# Patient Record
Sex: FEMALE | Race: WHITE
[De-identification: names, ages, dates, MRNs, and addresses within clinical notes are randomized per-mention and may not be internally consistent; named-entity substitution may affect disease eponyms.]

---

## 2018-06-01 ENCOUNTER — APPOINTMENT (OUTPATIENT)
Dept: ORTHOPEDIC SURGERY | Facility: CLINIC | Age: 43
End: 2018-06-01
Payer: COMMERCIAL

## 2018-06-01 VITALS — BODY MASS INDEX: 22.86 KG/M2 | HEIGHT: 63 IN | RESPIRATION RATE: 16 BRPM | WEIGHT: 129 LBS

## 2018-06-01 DIAGNOSIS — Z78.9 OTHER SPECIFIED HEALTH STATUS: ICD-10-CM

## 2018-06-01 DIAGNOSIS — M25.521 PAIN IN RIGHT ELBOW: ICD-10-CM

## 2018-06-01 PROBLEM — Z00.00 ENCOUNTER FOR PREVENTIVE HEALTH EXAMINATION: Status: ACTIVE | Noted: 2018-06-01

## 2018-06-01 PROCEDURE — 73070 X-RAY EXAM OF ELBOW: CPT | Mod: RT

## 2018-06-01 PROCEDURE — 99203 OFFICE O/P NEW LOW 30 MIN: CPT

## 2020-09-24 ENCOUNTER — RESULT REVIEW (OUTPATIENT)
Age: 45
End: 2020-09-24

## 2021-07-09 ENCOUNTER — TRANSCRIPTION ENCOUNTER (OUTPATIENT)
Age: 46
End: 2021-07-09

## 2022-04-08 ENCOUNTER — APPOINTMENT (RX ONLY)
Dept: URBAN - METROPOLITAN AREA CLINIC 23 | Facility: CLINIC | Age: 47
Setting detail: DERMATOLOGY
End: 2022-04-08

## 2022-04-08 DIAGNOSIS — Z41.9 ENCOUNTER FOR PROCEDURE FOR PURPOSES OTHER THAN REMEDYING HEALTH STATE, UNSPECIFIED: ICD-10-CM

## 2022-04-08 PROCEDURE — ? DYSPORT

## 2022-04-08 NOTE — PROCEDURE: DYSPORT
Lcl Root Units: 0
Expiration Date (Month Year): 12/31/2022
Glabellar Complex Units: [address] [facility]
Lot #: [id number]
Show Mentalis Units: No
Show Inventory Tab: Hide
Show Orbicularis Oculi Units: Yes
Additional Area 4 Location: lateral eyes
Additional Area 6 Location: [place] [place]
Periorbital Skin Units: 60
Detail Level: Zone
Additional Area 3 Location: lateral brows
Consent: Written consent obtained. Risks include but not limited to lid/brow ptosis, bruising, swelling, diplopia, temporary effect, incomplete chemical denervation.
Additional Area 2 Location: bunny line
Post-Care Instructions: Patient instructed to not lie down for 4 hours, limit physical activity for 24 hours and avoid manipulation of the treated areas.
Additional Area 5 Location: chin
Dilution (U/ 0.1cc): 1.5

## 2022-05-17 ENCOUNTER — APPOINTMENT (RX ONLY)
Dept: URBAN - METROPOLITAN AREA CLINIC 23 | Facility: CLINIC | Age: 47
Setting detail: DERMATOLOGY
End: 2022-05-17

## 2022-05-17 DIAGNOSIS — Z41.9 ENCOUNTER FOR PROCEDURE FOR PURPOSES OTHER THAN REMEDYING HEALTH STATE, UNSPECIFIED: ICD-10-CM

## 2022-05-17 DIAGNOSIS — L81.4 OTHER MELANIN HYPERPIGMENTATION: ICD-10-CM

## 2022-05-17 DIAGNOSIS — D18.0 HEMANGIOMA: ICD-10-CM

## 2022-05-17 DIAGNOSIS — B07.8 OTHER VIRAL WARTS: ICD-10-CM

## 2022-05-17 DIAGNOSIS — L85.3 XEROSIS CUTIS: ICD-10-CM

## 2022-05-17 PROBLEM — D18.01 HEMANGIOMA OF SKIN AND SUBCUTANEOUS TISSUE: Status: ACTIVE | Noted: 2022-05-17

## 2022-05-17 PROCEDURE — ? LIQUID NITROGEN

## 2022-05-17 PROCEDURE — ? COUNSELING

## 2022-05-17 PROCEDURE — ? DYSPORT

## 2022-05-17 PROCEDURE — 99213 OFFICE O/P EST LOW 20 MIN: CPT | Mod: 25

## 2022-05-17 PROCEDURE — ? IN-HOUSE DISPENSING PHARMACY

## 2022-05-17 PROCEDURE — 17110 DESTRUCTION B9 LES UP TO 14: CPT

## 2022-05-17 ASSESSMENT — LOCATION DETAILED DESCRIPTION DERM
LOCATION DETAILED: LEFT ANTERIOR DISTAL THIGH
LOCATION DETAILED: RIGHT DISTAL RADIAL THUMB

## 2022-05-17 ASSESSMENT — LOCATION SIMPLE DESCRIPTION DERM
LOCATION SIMPLE: RIGHT THUMB
LOCATION SIMPLE: LEFT THIGH

## 2022-05-17 ASSESSMENT — LOCATION ZONE DERM
LOCATION ZONE: LEG
LOCATION ZONE: FINGER

## 2022-05-17 NOTE — PROCEDURE: DYSPORT
Post-Care Instructions: Patient instructed to not lie down for 4 hours, limit physical activity for 24 hours and avoid manipulation of the treated areas.
Show Orbicularis Oculi Units: Yes
Show Inventory Tab: Show
[facility] Units: 0
Dilution (U/ 0.1cc): 1.5
Additional Area 6 Location: william [place]inche[place]
Show Right And Left Pupillary Line Units: No
Additional Area 1 Location: lateral eyes
Additional Area 3 Location: upper lip cutaneous
Detail Level: Zone
Additional Area 5 Location: glabella
Additional Area 2 Location: lateral brows
Consent: Written consent obtained. Risks include but not limited to lid/brow ptosis, bruising, swelling, diplopia, temporary effect, incomplete chemical denervation.
Expiration Date (Month Year): 2022-10
Forehead Units: 10
Lot #: [id number]
Additional Area 4 Location: left eyebrow

## 2022-05-17 NOTE — PROCEDURE: LIQUID NITROGEN
Medical Necessity Information: It is in your best interest to select a reason for this procedure from the list below. All of these items fulfill various CMS LCD requirements except the new and changing color options.
Spray Paint Technique: No
Show Aperture Variable?: Yes
Number Of Freeze-Thaw Cycles: 3 freeze-thaw cycles
Medical Necessity Clause: This procedure was medically necessary because the lesions that were treated were:
Post-Care Instructions: I reviewed with the patient in detail post-care instructions. Patient is to wear sunprotection, and avoid picking at any of the treated lesions. Pt may apply Vaseline to crusted or scabbing areas.
Spray Paint Text: The liquid nitrogen was applied to the skin utilizing a spray paint frosting technique.
Duration Of Freeze Thaw-Cycle (Seconds): 10
Consent: The patient's consent was obtained including but not limited to risks of crusting, scabbing, blistering, scarring, darker or lighter pigmentary change, recurrence, incomplete removal and infection.
Detail Level: Detailed

## 2022-05-17 NOTE — PROCEDURE: IN-HOUSE DISPENSING PHARMACY
Product 56 Units Dispensed: 0
Product 2 Price/Unit (In Dollars): 1
Product 25 Unit Type: mg
Product 13 Unit Type: unit(s)
Render Refills If Set To 0: No
Product 7 Application Directions: Apply one drop in each eye
Product 11 Unit Type: jar(s)
Product 10 Application Directions: Take two tablets x3 days
Name Of Product 13: Duoderm thin
Product 16 Amount/Unit (Numbers Only): 5
Name Of Product 14: Valtrex 500mg
Product 2 Refills: 3
Product 15 Application Directions: Take one tablet twice daily for 3-6 months
Name Of Product 12: Skin Better Even tone Serum
Name Of Product 31: Prednisone 20 mg
Product 7 Unit Type: kit(s)
Product 10 Unit Type: bottle(s)
Name Of Product 1: Tri-Katarina cream
Name Of Product 18: Bette Polanco
Name Of Product 21: Defenage skincare
Name Of Product 8: Prednisone 10mg
Product 6 Amount/Unit (Numbers Only): [address] [facility]
Product 15 Unit Type: tablets
Product 13 Price/Unit (In Dollars): $10.00
Product 12 Refills: 2
Name Of Product 11: Hydroquinone 8% pads
Product 6 Unit Type: grams
Product 5 Application Directions: Apply 1 drop to both Eyelashes at bedtime only
Product 12 Application Directions: Apply to the entire face in the Am and Pm
Product 14 Amount/Unit (Numbers Only): [address]
Detail Level: Detailed
Name Of Product 7: Verito Cueva
Product 13 Application Directions: Apply to wound site every 48 hours as indicated.
Name Of Product 10: Prednisone 20mg
Name Of Product 20: Apply pea size amount to entire face at bedtime only at bedtime only.
Product 4 Application Directions: Take 1 tablet today in office prior to procedure for anxiety.
Product 17 Application Directions: Take one tablet 20 mg today in office and 10 mg tomorrow if needed for swelling. Dispense in office.
Product 11 Application Directions: Apply to the face at bedtime
Product 16 Application Directions: Apply to lashes at bedtime only
Product 3 Application Directions: Apply to face  in the morning as indicated
Name Of Product 22: Calcipotriene/ 5 FLuoracil cream
Product 16 Unit Type: ml
Name Of Product 5: Latisse 3 ml
Product 19 Application Directions: Apply once daily to each eye.
Product 6 Application Directions: Apply pea size amount with cereve cream to chest at bedtime only.
Product 9 Application Directions: Apply to affected area on the face area am as indicated.
Product 2 Application Directions: Apply a pea size amount to Entire face at bedtime
Product 15 Amount/Unit (Numbers Only): [address] [facility]
Product 1 Price/Unit (In Dollars): 0.00
Product 22 Application Directions: Apply small amount to temple and forehead twice daily as indicated x 3 or 5 days
Name Of Product 4: Valium 5 mg
Product 14 Application Directions: Take one tablet prior to procedure. Continue twice daily x 3 days as indicated.
Product 2 Unit Type: tube(s)
Name Of Product 16: Latisse
Product 1 Application Directions: Apply to entire face at bedtime as indicated.
Name Of Product 3: Hydroquinone brightening pads 6%
Product 21 Application Directions: Use as directed
Product 8 Application Directions: Take 1 tablet
Name Of Product 15: Viviscal PRO
Name Of Product 6: Tretinoin 0.05% cream
Name Of Product 9: Brightening pads 6%
Name Of Product 19: Upneeq ophthalmic solution

## 2022-05-17 NOTE — PROCEDURE: MIPS QUALITY
Detail Level: Detailed
Additional Notes: Currently not taking any medications
Quality 130: Documentation Of Current Medications In The Medical Record: Documentation of a medical reason(s) for not documenting, updating, or reviewing the patientâs current medications list (e.g., patient is in an urgent or emergent medical situation)

## 2022-05-17 NOTE — PROCEDURE: COUNSELING
Detail Level: Zone
Moisturizer Recommendations: Ceramax cream
Detail Level: Generalized
Detail Level: Detailed

## 2022-08-02 ENCOUNTER — APPOINTMENT (RX ONLY)
Dept: URBAN - METROPOLITAN AREA CLINIC 23 | Facility: CLINIC | Age: 47
Setting detail: DERMATOLOGY
End: 2022-08-02

## 2022-08-02 DIAGNOSIS — Z41.9 ENCOUNTER FOR PROCEDURE FOR PURPOSES OTHER THAN REMEDYING HEALTH STATE, UNSPECIFIED: ICD-10-CM

## 2022-08-02 PROCEDURE — ? DYSPORT

## 2022-08-02 NOTE — PROCEDURE: DYSPORT
Show Additional Area 5: Yes
Additional Area 2 Location: high forehead
Post-Care Instructions: Patient instructed to not lie down for 4 hours, limit physical activity for 24 hours and avoid manipulation of the treated areas.
Show Right And Left Pupillary Line Units: No
Levator Labii Superioris Units: 0
Periorbital Skin Units: [address]
Detail Level: Zone
Lot #: [id number]
Additional Area 3 Location: lateral brow
Show Inventory Tab: Show
Additional Area 3 Units: 10
Forehead Units: 40
Dilution (U/ 0.1cc): 1.5
Additional Area 5 Location: under eyes
Additional Area 1 Location: Rt perioribital
Glabellar Complex Units: 27
Additional Area 4 Location: chin
Consent: Written consent obtained. Risks include but not limited to lid/brow ptosis, bruising, swelling, diplopia, temporary effect, incomplete chemical denervation.
Expiration Date (Month Year): 2022-10
Additional Area 6 Location: [place]

## 2022-10-18 ENCOUNTER — RX ONLY (OUTPATIENT)
Age: 47
Setting detail: RX ONLY
End: 2022-10-18

## 2022-10-18 RX ORDER — VALACYCLOVIR HYDROCHLORIDE 500 MG/1
TABLET, FILM COATED ORAL
Qty: 15 | Refills: 2 | Status: ERX | COMMUNITY
Start: 2022-10-18

## 2022-12-01 ENCOUNTER — APPOINTMENT (RX ONLY)
Dept: URBAN - METROPOLITAN AREA CLINIC 23 | Facility: CLINIC | Age: 47
Setting detail: DERMATOLOGY
End: 2022-12-01

## 2022-12-01 DIAGNOSIS — Z41.9 ENCOUNTER FOR PROCEDURE FOR PURPOSES OTHER THAN REMEDYING HEALTH STATE, UNSPECIFIED: ICD-10-CM

## 2022-12-01 PROCEDURE — ? RECOMMENDATIONS

## 2022-12-01 PROCEDURE — ? DYSPORT

## 2022-12-01 NOTE — PROCEDURE: DYSPORT
Glabellar Complex Units: [address]
Additional Area 1 Location: lateral brows
Show Periorbital Units: Yes
Show Inventory Tab: Show
R Brow Units: 0
Show Right And Left Pupillary Line Units: No
Additional Area 1 Units: 10
Dilution (U/ 0.1cc): 1.5
Additional Area 5 Location: lateral eyes
Additional Area 2 Location: high forehead
Consent: Written consent obtained. Risks include but not limited to lid/brow ptosis, bruising, swelling, diplopia, temporary effect, incomplete chemical denervation.
Periorbital Skin Units: [address] [facility]
Post-Care Instructions: Patient instructed to not lie down for 4 hours, limit physical activity for 24 hours and avoid manipulation of the treated areas.
Expiration Date (Month Year): 2022-10
Additional Area 6 Location: [place]iam [place]
Detail Level: Zone
Lot #: [id number]
Forehead Units: [address]
Additional Area 4 Location: chin

## 2022-12-01 NOTE — PROCEDURE: RECOMMENDATIONS
Detail Level: Zone
Recommendations (Free Text): Ematrix neck $500
Render Risk Assessment In Note?: no

## 2023-01-31 ENCOUNTER — RX ONLY (OUTPATIENT)
Age: 48
Setting detail: RX ONLY
End: 2023-01-31

## 2023-01-31 ENCOUNTER — APPOINTMENT (RX ONLY)
Dept: URBAN - METROPOLITAN AREA CLINIC 23 | Facility: CLINIC | Age: 48
Setting detail: DERMATOLOGY
End: 2023-01-31

## 2023-01-31 DIAGNOSIS — Z41.9 ENCOUNTER FOR PROCEDURE FOR PURPOSES OTHER THAN REMEDYING HEALTH STATE, UNSPECIFIED: ICD-10-CM

## 2023-01-31 PROCEDURE — ? EMATRIX

## 2023-01-31 PROCEDURE — ? BOTOX

## 2023-01-31 PROCEDURE — ? INVENTORY

## 2023-01-31 PROCEDURE — ? DYSPORT

## 2023-01-31 RX ORDER — LIDOCAINE 50 MG/G
OINTMENT TOPICAL
Qty: 30 | Refills: 0 | Status: ERX | COMMUNITY
Start: 2023-01-31

## 2023-01-31 ASSESSMENT — LOCATION SIMPLE DESCRIPTION DERM: LOCATION SIMPLE: LEFT ANTERIOR NECK

## 2023-01-31 ASSESSMENT — LOCATION DETAILED DESCRIPTION DERM: LOCATION DETAILED: LEFT SUPERIOR LATERAL NECK

## 2023-01-31 ASSESSMENT — LOCATION ZONE DERM: LOCATION ZONE: NECK

## 2023-01-31 NOTE — PROCEDURE: BOTOX
Nasal Root Units: 0
Additional Area 6 Location: Lip flip
Show Topical Anesthesia: Yes
Show Inventory Tab: Show
Show Right And Left Pupillary Line Units: No
Dilution (U/0.1 Cc): 2.5
Additional Area 1 Location: upper lip
Additional Area 5 Location: lateral brows
Additional Area 1 Units: 4
Detail Level: Detailed
Additional Area 3 Location: chin
Consent: Written consent obtained. Risks include but not limited to lid/brow ptosis, bruising, swelling, diplopia, temporary effect, incomplete chemical denervation.
Lot #: [id number]
Expiration Date (Month Year): 2024/05
Incrementing Botox Units: By 0.5 Units
Post-Care Instructions: Patient instructed to not lie down for 4 hours and limit physical activity for 24 hours. Patient instructed not to travel by airplane for 48 hours.
Additional Area 2 Location: lateral eyes

## 2023-01-31 NOTE — PROCEDURE: DYSPORT
Additional Area 2 Location: lateral brows NC
Show Additional Area 4: Yes
Left Pupillary Line Units: 0
Show Lcl Units: No
Additional Area 5 Location: lateral eyes
Dilution (U/ 0.1cc): 1.5
Detail Level: Zone
Consent: Written consent obtained. Risks include but not limited to lid/brow ptosis, bruising, swelling, diplopia, temporary effect, incomplete chemical denervation.
Expiration Date (Month Year): 2022-10
Additional Area 6 Location: [place]
Post-Care Instructions: Patient instructed to not lie down for 4 hours, limit physical activity for 24 hours and avoid manipulation of the treated areas.
Show Inventory Tab: Show
Forehead Units: [address]
Lot #: [id number]
Additional Area 1 Location: masseters ( TMJ)
Additional Area 4 Location: high forehead

## 2023-01-31 NOTE — PROCEDURE: EMATRIX
Detail Level: Zone
Consent: Written consent obtained, risks reviewed including but not limited to crusting, scabbing, blistering, scarring, darker or lighter pigmentary change, paradoxical hair regrowth, incomplete removal of hair and infection.
Program: GRACE
Endpoint: Immediate endpoint: perifollicular erythema and edema. Post care reviewed with patient.
Fluence: 53 J
Use Hsv Prophylaxis: No
Price (Use Numbers Only, No Special Characters Or $): 500.00
Post-Care Instructions: I reviewed with the patient in detail post-care instructions. Patient should avoid sun for a minimum of 4 weeks before and after treatment.

## 2023-03-29 ENCOUNTER — APPOINTMENT (RX ONLY)
Dept: URBAN - METROPOLITAN AREA CLINIC 23 | Facility: CLINIC | Age: 48
Setting detail: DERMATOLOGY
End: 2023-03-29

## 2023-03-29 DIAGNOSIS — Z41.9 ENCOUNTER FOR PROCEDURE FOR PURPOSES OTHER THAN REMEDYING HEALTH STATE, UNSPECIFIED: ICD-10-CM

## 2023-03-29 PROCEDURE — ? PICOWAY LASER

## 2023-03-29 PROCEDURE — ? EMATRIX

## 2023-03-29 PROCEDURE — ? ADDITIONAL NOTES

## 2023-03-29 ASSESSMENT — LOCATION ZONE DERM: LOCATION ZONE: FACE

## 2023-03-29 ASSESSMENT — LOCATION SIMPLE DESCRIPTION DERM: LOCATION SIMPLE: LEFT CHEEK

## 2023-03-29 ASSESSMENT — LOCATION DETAILED DESCRIPTION DERM: LOCATION DETAILED: LEFT SUPERIOR MEDIAL BUCCAL CHEEK

## 2023-03-29 NOTE — PROCEDURE: EMATRIX
Program: GRACE
Endpoint: Immediate endpoint: perifollicular erythema and edema. Post care reviewed with patient.
Fluence: 59 J
Consent: Written consent obtained, risks reviewed including but not limited to crusting, scabbing, blistering, scarring, darker or lighter pigmentary change, paradoxical hair regrowth, incomplete removal of hair and infection.
Post-Care Instructions: I reviewed with the patient in detail post-care instructions. Patient should avoid sun for a minimum of 4 weeks before and after treatment.
Price (Use Numbers Only, No Special Characters Or $): 500.00
Use Hsv Prophylaxis: No
Detail Level: Zone

## 2023-03-29 NOTE — PROCEDURE: ADDITIONAL NOTES
Render Risk Assessment In Note?: no
Additional Notes: Patient could not tolerate Ematrix today, converted treatment to [facility] treatment today.
Detail Level: Simple

## 2023-05-17 ENCOUNTER — APPOINTMENT (RX ONLY)
Dept: URBAN - METROPOLITAN AREA CLINIC 23 | Facility: CLINIC | Age: 48
Setting detail: DERMATOLOGY
End: 2023-05-17

## 2023-05-17 DIAGNOSIS — Z41.9 ENCOUNTER FOR PROCEDURE FOR PURPOSES OTHER THAN REMEDYING HEALTH STATE, UNSPECIFIED: ICD-10-CM

## 2023-05-17 PROCEDURE — ? DYSPORT

## 2023-05-17 PROCEDURE — ? MICRONEEDLING

## 2023-05-17 PROCEDURE — ? PICOWAY LASER

## 2023-05-17 PROCEDURE — ? INVENTORY

## 2023-05-17 NOTE — PROCEDURE: PICOWAY LASER
Spot Size: 6.0 mm x 6.0 mm
Treatment Number: 0
Detail Level: Zone
Treatment Number: 2
Hide Pulse Duration?: No
Frequency (Hz): 6 Hz
Fluence (J/Cm2): 0.7
Wavelength: 532 nm
Handpiece: Resolve
Handpiece: Zoom
Fluence (J/Cm2): 1.9
Frequency (Hz): 6Hz
Fluence (J/Cm2): 0.6
Wavelength: 1064 nm
Pre-Procedure: Prior to proceeding the treatment areas were cleaned and all present put on their eye protection.
Price (Use Numbers Only, No Special Characters Or $): Finn 354
Location Override: neck
Spot Size: 6.0 mm
Consent: Written consent obtained, risks reviewed including but not limited to crusting, scabbing, blistering, scarring, darker or lighter pigmentary change, paradoxical hair regrowth, incomplete removal of hair and infection.
Frequency (Hz): 6
Fluence (J/Cm2): 1.7
Post-Procedure Care: Immediate endpoint: perifollicular erythema and edema. Vaseline and ice applied. Post care reviewed with patient.
Post-Care Instructions: I reviewed with the patient in detail post-care instructions. Patient should avoid sun for a minimum of 4 weeks before and after treatment.

## 2023-05-17 NOTE — PROCEDURE: MICRONEEDLING
Infusions (Optional): hyaluronic acid
Depth In Mm: 2
Depth In Mm: 0.8
Detail Level: Zone
Depth In Mm: 0.1
Post-Care Instructions: After the procedure, take precautions agains sun exposure. Do not apply sunscreen for 12 hours after the procedure. Do not apply make-up for 12 hours after the procedure. Avoid alcohol based toners for 10-14 days. After 2-3 days patients can return to their regular skin regimen. \\nFace tip Lot # [id number]
Treatment Number (Optional): 1
Price (Use Numbers Only, No Special Characters Or $): Carlos Post
Consent: Written consent obtained, risks reviewed including but not limited to pain, scarring, infection and incomplete improvement. Patient understands the procedure is cosmetic in nature and will require out of pocket payment.

## 2023-05-17 NOTE — PROCEDURE: DYSPORT
Levator Labii Superioris Units: 0
Additional Area 5 Location: chin
Additional Area 6 Location: lip flip
Show Right And Left Brow Units: No
Additional Area 2 Location: High Forehead 2cm above brows
Post-Care Instructions: Patient instructed to not lie down for 4 hours, limit physical activity for 24 hours and avoid manipulation of the treated areas.
Expiration Date (Month Year): 2022-10
Periorbital Skin Units: 36
Show Additional Area 5: Yes
Detail Level: Zone
Additional Area 3 Location: lateral eyes
Lot #: [id number]
Glabellar Complex Units: [address]
Show Inventory Tab: Show
Forehead Units: [address] [facility]
Dilution (U/ 0.1cc): 1.5
Additional Area 4 Location: lateral brows
Additional Area 1 Units: 10
Consent: Written consent obtained. Risks include but not limited to lid/brow ptosis, bruising, swelling, diplopia, temporary effect, incomplete chemical denervation.

## 2023-06-19 ENCOUNTER — APPOINTMENT (RX ONLY)
Dept: URBAN - METROPOLITAN AREA CLINIC 23 | Facility: CLINIC | Age: 48
Setting detail: DERMATOLOGY
End: 2023-06-19

## 2023-06-19 DIAGNOSIS — Z41.9 ENCOUNTER FOR PROCEDURE FOR PURPOSES OTHER THAN REMEDYING HEALTH STATE, UNSPECIFIED: ICD-10-CM

## 2023-06-19 PROCEDURE — ? PICOWAY LASER

## 2023-06-19 PROCEDURE — ? MICRONEEDLING

## 2023-06-19 PROCEDURE — ? INVENTORY

## 2023-06-19 NOTE — PROCEDURE: PICOWAY LASER
Fluence (J/Cm2): 2.1
Treatment Number: 0
Frequency (Hz): 6
Pre-Procedure: Prior to proceeding the treatment areas were cleaned and all present put on their eye protection.
Wavelength: 532 nm
Post-Care Instructions: I reviewed with the patient in detail post-care instructions. Patient should avoid sun for a minimum of 4 weeks before and after treatment.
Spot Size: 6.0 mm x 6.0 mm
Handpiece: Resolve
Spot Size: 6.0 mm
Hide Pulse Duration?: No
Fluence (J/Cm2): 0.5
Spot Size: 6.5 mm
Passes: 3
Post-Procedure Care: Immediate endpoint: perifollicular erythema and edema. Vaseline and ice applied. Post care reviewed with patient.
Price (Use Numbers Only, No Special Characters Or $): Finn 354
Fluence (J/Cm2): 0.9
Detail Level: Zone
Consent: Written consent obtained, risks reviewed including but not limited to crusting, scabbing, blistering, scarring, darker or lighter pigmentary change, paradoxical hair regrowth, incomplete removal of hair and infection.
Fluence (J/Cm2): 0.7
Frequency (Hz): 6 hz

## 2023-06-19 NOTE — PROCEDURE: MICRONEEDLING
Depth In Mm: 0.1
Post-Care Instructions: After the procedure, take precautions agains sun exposure. Do not apply sunscreen for 12 hours after the procedure. Do not apply make-up for 12 hours after the procedure. Avoid alcohol based toners for 10-14 days. After 2-3 days patients can return to their regular skin regimen. \\nFace tip Lot # [id number]
Infusions (Optional): PRP
Detail Level: Zone
Depth In Mm: 0.8
Location #2: neck
Consent: Written consent obtained, risks reviewed including but not limited to pain, scarring, infection and incomplete improvement. Patient understands the procedure is cosmetic in nature and will require out of pocket payment.
Treatment Number (Optional): 1
Location #1: full face
Depth In Mm: 2
Topical Anesthesia?: BLT cream (benzocaine 8%, lidocaine 5%, tetracaine 4%)
Price (Use Numbers Only, No Special Characters Or $): [address]

## 2023-07-21 ENCOUNTER — APPOINTMENT (RX ONLY)
Dept: URBAN - METROPOLITAN AREA CLINIC 23 | Facility: CLINIC | Age: 48
Setting detail: DERMATOLOGY
End: 2023-07-21

## 2023-07-21 DIAGNOSIS — Z41.9 ENCOUNTER FOR PROCEDURE FOR PURPOSES OTHER THAN REMEDYING HEALTH STATE, UNSPECIFIED: ICD-10-CM

## 2023-07-21 PROCEDURE — ? MICRONEEDLING

## 2023-07-21 PROCEDURE — ? INVENTORY

## 2023-07-21 NOTE — PROCEDURE: MICRONEEDLING
Depth In Mm: 0.1
Depth In Mm: 0.8
Location #1: face
Post-Care Instructions: After the procedure, take precautions agains sun exposure. Do not apply sunscreen for 12 hours after the procedure. Do not apply make-up for 12 hours after the procedure. Avoid alcohol based toners for 10-14 days. After 2-3 days patients can return to their regular skin regimen. \\nMatrix-Pro tip Lot # [id number] Exp:10/28/23
Price (Use Numbers Only, No Special Characters Or $): 750.00
Treatment Number (Optional): 0
Depth In Mm: 1
Topical Anesthesia?: 20% benzocaine, 7% lidocaine, and 7% tetracaine
Infusions (Optional): PRP
Detail Level: Zone
Depth In Mm: 1.4
Consent: Written consent obtained, risks reviewed including but not limited to pain, scarring, infection and incomplete improvement. Patient understands the procedure is cosmetic in nature and will require out of pocket payment.

## 2023-09-06 ENCOUNTER — APPOINTMENT (RX ONLY)
Dept: URBAN - METROPOLITAN AREA CLINIC 23 | Facility: CLINIC | Age: 48
Setting detail: DERMATOLOGY
End: 2023-09-06

## 2023-09-06 DIAGNOSIS — Z41.9 ENCOUNTER FOR PROCEDURE FOR PURPOSES OTHER THAN REMEDYING HEALTH STATE, UNSPECIFIED: ICD-10-CM

## 2023-09-06 PROCEDURE — ? DYSPORT

## 2023-09-06 PROCEDURE — ? RECOMMENDATIONS

## 2023-09-06 NOTE — PROCEDURE: RECOMMENDATIONS
Render Risk Assessment In Note?: no
Recommendations (Free Text): Pro matrix face quoted $1200
Detail Level: Detailed

## 2023-09-06 NOTE — PROCEDURE: DYSPORT
Levator Labii Superioris Units: 0
Additional Area 5 Location: chin
Additional Area 6 Location: lip flip
Show Right And Left Brow Units: No
Additional Area 2 Location: High Forehead 2cm above brows
Post-Care Instructions: Patient instructed to not lie down for 4 hours, limit physical activity for 24 hours and avoid manipulation of the treated areas.
Expiration Date (Month Year): 2022-10
Periorbital Skin Units: [address]
Show Additional Area 5: Yes
Detail Level: Zone
Additional Area 3 Location: lateral eyes
Lot #: [id number]
Glabellar Complex Units: [address]
Show Inventory Tab: Show
Forehead Units: [facility] [facility]
Dilution (U/ 0.1cc): 1.5
Additional Area 4 Location: lateral brows
Additional Area 1 Units: 10
Consent: Written consent obtained. Risks include but not limited to lid/brow ptosis, bruising, swelling, diplopia, temporary effect, incomplete chemical denervation.

## 2024-04-10 NOTE — PROCEDURE: PICOWAY LASER
Handpiece: Resolve
Spot Size: 6.0 mm x 6.0 mm
Treatment Number: 0
Wavelength: 532 nm
Fluence (J/Cm2): 0.7
Wavelength: 1064 nm
Fluence (J/Cm2): 1.9
Fluence (J/Cm2): 2.1
Consent: Written consent obtained, risks reviewed including but not limited to crusting, scabbing, blistering, scarring, darker or lighter pigmentary change, paradoxical hair regrowth, incomplete removal of hair and infection.
Post-Care Instructions: I reviewed with the patient in detail post-care instructions. Patient should avoid sun for a minimum of 4 weeks before and after treatment.
Frequency (Hz): 6
Fluence (J/Cm2): 0.5
Price (Use Numbers Only, No Special Characters Or $): Finn 354
10-Apr-2024 22:47
Pre-Procedure: Prior to proceeding the treatment areas were cleaned and all present put on their eye protection.
Post-Procedure Care: Immediate endpoint: perifollicular erythema and edema. Vaseline and ice applied. Post care reviewed with patient.
Spot Size: 6.0 mm
Detail Level: Zone
Hide Pulse Duration?: No
Frequency (Hz): 6Hz

## 2024-04-17 ENCOUNTER — APPOINTMENT (RX ONLY)
Dept: URBAN - METROPOLITAN AREA CLINIC 23 | Facility: CLINIC | Age: 49
Setting detail: DERMATOLOGY
End: 2024-04-17

## 2024-04-17 DIAGNOSIS — Z41.9 ENCOUNTER FOR PROCEDURE FOR PURPOSES OTHER THAN REMEDYING HEALTH STATE, UNSPECIFIED: ICD-10-CM

## 2024-04-17 PROCEDURE — ? DYSPORT

## 2024-04-17 PROCEDURE — ? MICRONEEDLING

## 2024-04-17 PROCEDURE — ? INVENTORY

## 2024-04-17 PROCEDURE — ? RECOMMENDATIONS

## 2024-04-17 NOTE — PROCEDURE: DYSPORT
Show Ucl Units: No
Left Periorbital Skin Units: 0
Show Glabellar Units: Yes
Forehead Units: 20
Lot #: [id number]
Additional Area 4 Location: Neckbands
Detail Level: Zone
Show Inventory Tab: Show
Glabellar Complex Units: 50
Additional Area 1 Location: lat brows
Additional Area 1 Units: 10
Additional Area 2 Location: neck bands
Additional Area 5 Location: upper lip
Dilution (U/0.1 Cc): 1.5
Consent: Written consent obtained. Risks include but not limited to lid/brow ptosis, bruising, swelling, diplopia, temporary effect, incomplete chemical denervation.
Periorbital Skin Units: 40
Post-Care Instructions: Patient instructed to not lie down for 4 hours, limit physical activity for 24 hours and avoid manipulation of the treated areas.
Expiration Date (Month Year): 2022-10
Additional Area 3 Location: forehead NC

## 2024-04-17 NOTE — PROCEDURE: RECOMMENDATIONS
Detail Level: Detailed
Recommendations (Free Text): Volbella for lips
Render Risk Assessment In Note?: no

## 2024-04-17 NOTE — PROCEDURE: MICRONEEDLING
Depth In Mm (Location #5): 1.8
Post-Care Instructions: After the procedure, take precautions agains sun exposure. Do not apply sunscreen for 12 hours after the procedure. Do not apply make-up for 12 hours after the procedure. Avoid alcohol based toners for 10-14 days. After 2-3 days patients can return to their regular skin regimen.\\nMatrix-Pro tip Lot # [id number] Exp:10/28/23
How Many Cc Of Bacteriostatic 0.9% Saline Were Added?: 0
Location #1: full face
Depth In Mm (Location #2): 1.4
Infusions (Optional): PRP
Topical Anesthesia?: 23% lidocaine, 7% tetracaine
Depth In Mm (Location #6): 0.1
Consent: Written consent obtained, risks reviewed including but not limited to pain, scarring, infection and incomplete improvement.  Patient understands the procedure is cosmetic in nature and will require out of pocket payment.
Price (Use Numbers Only, No Special Characters Or $): 026
Detail Level: Zone
Depth In Mm (Location #4): 1.6
Depth In Mm (Location #1): 1

## 2024-04-24 ENCOUNTER — APPOINTMENT (RX ONLY)
Dept: URBAN - METROPOLITAN AREA CLINIC 23 | Facility: CLINIC | Age: 49
Setting detail: DERMATOLOGY
End: 2024-04-24

## 2024-04-24 ENCOUNTER — RX ONLY (OUTPATIENT)
Age: 49
Setting detail: RX ONLY
End: 2024-04-24

## 2024-04-24 DIAGNOSIS — Z41.9 ENCOUNTER FOR PROCEDURE FOR PURPOSES OTHER THAN REMEDYING HEALTH STATE, UNSPECIFIED: ICD-10-CM

## 2024-04-24 PROCEDURE — ? DYSPORT

## 2024-04-24 PROCEDURE — ? FILLERS

## 2024-04-24 RX ORDER — VALACYCLOVIR HYDROCHLORIDE 500 MG/1
TABLET, FILM COATED ORAL
Qty: 30 | Refills: 0 | Status: ERX

## 2024-04-24 NOTE — PROCEDURE: DYSPORT
Lot #: [id number]
Additional Area 4 Location: Neckbands
Show Ucl Units: No
Nasal Root Units: 0
Show Additional Area 1: Yes
Detail Level: Zone
Additional Area 1 Units: 10
Additional Area 2 Location: lateral brows
Additional Comments: touch up
Dilution (U/0.1 Cc): 1.5
Additional Area 5 Location: upper lip
Consent: Written consent obtained. Risks include but not limited to lid/brow ptosis, bruising, swelling, diplopia, temporary effect, incomplete chemical denervation.
Forehead Units: 20
Show Inventory Tab: Show
Additional Area 3 Location: forehead
Post-Care Instructions: Patient instructed to not lie down for 4 hours, limit physical activity for 24 hours and avoid manipulation of the treated areas.
Expiration Date (Month Year): 2022-10
Additional Area 6 Location: neck bands
Additional Area 1 Location: neck

## 2024-04-24 NOTE — PROCEDURE: FILLERS
Number Of Syringes (Required For Inventory): 1
Cheeks Filler Volume In Cc: 0
Show Inventory Tab: Show
Include Cannula Size?: 25G
Additional Area 1 Location: lateral jaw
Include Cannula Information In Note?: No
Additional Area 2 Location: katie oral fine lines
Additional Area 3 Location: vermillion lips, upper lip fine lines
Include Cannula Length?: 1.5 inch
Include Cannula Information In Note?: Yes
Additional Area 4 Location: lip liner, lip. oral commisures, vermillion lips.
Consent: Written consent obtained. Risks include but not limited to bruising, beading, irregular texture, ulceration, infection, allergic reaction, scar formation, incomplete augmentation, temporary nature, procedural pain.
Post-Care Instructions: Patient instructed to apply ice to reduce swelling.
Anesthesia Volume In Cc: 0.5
Additional Area 5 Location: chin
Additional Area 1 Location: lateral and medial cheeks
Additional Area 2 Location: Perioral fine lines, vermillion lips
Additional Area 1 Location: cheek fine lines
Additional Area 3 Location: cheeks, jawline
Map Statment: See Attach Map for Complete Details
Additional Area 1 Location: upper lip, upper lip line, oral commissures
Additional Area 4 Location: katie oral
Additional Area 2 Location: lateral cheeks, chin, lateral jawline
Inventory Information: This plan will send filler information to inventory based on the fillers you select. Multiple fillers can be sent but you must ensure you select the appropriate fillers in the inventory tab.
Detail Level: Detailed
Additional Area 3 Location: polina
Filler: Juvederm Volbella XC
Include Cannula Brand?: DermaSculpt

## 2024-08-21 ENCOUNTER — APPOINTMENT (RX ONLY)
Dept: URBAN - METROPOLITAN AREA CLINIC 23 | Facility: CLINIC | Age: 49
Setting detail: DERMATOLOGY
End: 2024-08-21

## 2024-08-21 DIAGNOSIS — Z41.9 ENCOUNTER FOR PROCEDURE FOR PURPOSES OTHER THAN REMEDYING HEALTH STATE, UNSPECIFIED: ICD-10-CM

## 2024-08-21 PROCEDURE — ? DYSPORT

## 2024-08-21 PROCEDURE — ? IN-HOUSE DISPENSING PHARMACY

## 2024-08-21 NOTE — PROCEDURE: DYSPORT
Show Ucl Units: No
Left Periorbital Skin Units: 0
Show Glabellar Units: Yes
Forehead Units: 20
Lot #: [id number]
Additional Area 4 Location: Neckbands
Detail Level: Zone
Show Inventory Tab: Show
Glabellar Complex Units: 50
Additional Area 1 Location: lat brows
Additional Area 1 Units: 10
Additional Area 2 Location: neck bands
Additional Area 5 Location: upper lip
Dilution (U/0.1 Cc): 1.5
Consent: Written consent obtained. Risks include but not limited to lid/brow ptosis, bruising, swelling, diplopia, temporary effect, incomplete chemical denervation.
Periorbital Skin Units: 40
Post-Care Instructions: Patient instructed to not lie down for 4 hours, limit physical activity for 24 hours and avoid manipulation of the treated areas.
Expiration Date (Month Year): 2022-10
Additional Area 3 Location: forehead NC

## 2024-08-21 NOTE — PROCEDURE: IN-HOUSE DISPENSING PHARMACY
Product 53 Price/Unit (In Dollars): 0
Product 62 Unit Type: mg
Product 4 Price/Unit (In Dollars): 0.00
Name Of Product 23: Diazepam (Valium)
Product 5 Unit Type: kit(s)
Product 8 Unit Type: tablets
Product 7 Refills: 1
Product 21 Unit Type: tube(s)
Product 15 Unit Type: ml
Product 2 Unit Type: grams
Name Of Product 19: Triluma
Product 1 Unit Type: jar(s)
Name Of Product 9: Brightening pads 8%
Name Of Product 6: Prednisone 20 mg
Product 10 Application Directions: Apply to face once daily
Product 1 Application Directions: Apply pad to face at morning
Name Of Product 16: Latisse
Name Of Product 3: Hydroquinone brightening pads 10%
Product 14 Amount/Unit (Numbers Only): 500
Product 4 Amount/Unit (Numbers Only): 5 mL
Product 13 Application Directions: Apply to wound site every 48 hours as indicated.
Name Of Product 2: Tretinoin 0.05%
Product 26 Application Directions: Take one pill by mouth
Product 19 Application Directions: Apply thin layer at bedtime to melasma.
Product 23 Application Directions: Take by mouth.
Name Of Product 8: Valium 5 mg
Name Of Product 15: Bri
Name Of Product 5: Upneeq
Product 23 Amount/Unit (Numbers Only): 5
Render Product Pricing In Note: No
Name Of Product 18: Tretinoin cream 0.05%
Product 12 Application Directions: Apply to the entire face in the Am and Pm
Product 22 Application Directions: Apply small amount to temple and forehead twice daily as indicated x 3 or 5 days
Product 24 Price/Unit (In Dollars): 20
Product 21 Application Directions: Apply pea size amount to entire face at bedtime only
Product 5 Application Directions: Apply one drop to each eye
Product 15 Application Directions: Apply to affected skin as indicated
Product 12 Unit Type: bottle(s)
Product 8 Application Directions: Take 1 tablet before the procedure by mouth
Name Of Product 1: Hydroquinone brightens pads 8%
Product 18 Application Directions: Mix with cerave cream and apply to body daily after shower
Name Of Product 13: Duoderm thin
Name Of Product 26: Tylenol 500mg
Product 13 Price/Unit (In Dollars): $10.00
Product 20 Application Directions: Apply thin layer at bedtime to entire face
Product 7 Application Directions: Apply one drop in each eye
Product 14 Application Directions: Take one tablet prior to procedure. Continue twice daily x 3 days as indicated.
Product 4 Application Directions: Apply to Eyelash’s at bedtime
Name Of Product 22: Calcipotriene/ 5 FLuoracil cream
Name Of Product 25: Valium 5mg
Product 17 Application Directions: Take one tablet 20 mg today in office and 10 mg tomorrow if needed for swelling. Dispense in office.
Name Of Product 12: Skin Better Even tone Serum
Detail Level: Detailed
Product 26 Amount/Unit (Numbers Only): 2
Product 9 Application Directions: Apply to affected area on the face area am as indicated.
Product 6 Application Directions: Take 1 pill by mouth today,and another tomorrow
Product 16 Application Directions: Apply to lashes at bedtime only
Name Of Product 11: Latisse 0.03%
Product 3 Application Directions: Apply to face daily as indicated
Product 2 Application Directions: Mix with moisturizer at night time only. Start off 2-3 times a week, then build up to every night as tolerated. Apply sunscreen every morning.
Product 13 Unit Type: unit(s)
Name Of Product 14: Valtrex 1 gram
Name Of Product 24: Tretinoin 0.05% cream
Name Of Product 4: Latisse 5 ml
Product 24 Application Directions: Apply pea size amount to entire face at bedtime only.
Product 11 Application Directions: Apply to eyelashes daily
Product 15 Amount/Unit (Numbers Only): 30

## 2024-11-15 ENCOUNTER — APPOINTMENT (RX ONLY)
Dept: URBAN - METROPOLITAN AREA CLINIC 23 | Facility: CLINIC | Age: 49
Setting detail: DERMATOLOGY
End: 2024-11-15

## 2024-11-15 DIAGNOSIS — L81.4 OTHER MELANIN HYPERPIGMENTATION: ICD-10-CM

## 2024-11-15 DIAGNOSIS — L82.1 OTHER SEBORRHEIC KERATOSIS: ICD-10-CM

## 2024-11-15 DIAGNOSIS — Z71.89 OTHER SPECIFIED COUNSELING: ICD-10-CM

## 2024-11-15 DIAGNOSIS — D49.2 NEOPLASM OF UNSPECIFIED BEHAVIOR OF BONE, SOFT TISSUE, AND SKIN: ICD-10-CM

## 2024-11-15 DIAGNOSIS — Z41.9 ENCOUNTER FOR PROCEDURE FOR PURPOSES OTHER THAN REMEDYING HEALTH STATE, UNSPECIFIED: ICD-10-CM

## 2024-11-15 PROCEDURE — 99213 OFFICE O/P EST LOW 20 MIN: CPT | Mod: 25

## 2024-11-15 PROCEDURE — ? DYSPORT

## 2024-11-15 PROCEDURE — ? SUNSCREEN RECOMMENDATIONS

## 2024-11-15 PROCEDURE — ? COUNSELING

## 2024-11-15 PROCEDURE — ? BIOPSY BY SHAVE METHOD

## 2024-11-15 PROCEDURE — ? FILLERS

## 2024-11-15 PROCEDURE — 11102 TANGNTL BX SKIN SINGLE LES: CPT

## 2024-11-15 ASSESSMENT — LOCATION SIMPLE DESCRIPTION DERM
LOCATION SIMPLE: INFERIOR FOREHEAD
LOCATION SIMPLE: RIGHT POSTERIOR THIGH
LOCATION SIMPLE: LEFT LOWER BACK
LOCATION SIMPLE: RIGHT UPPER BACK

## 2024-11-15 ASSESSMENT — LOCATION DETAILED DESCRIPTION DERM
LOCATION DETAILED: RIGHT DISTAL POSTERIOR THIGH
LOCATION DETAILED: LEFT SUPERIOR MEDIAL MIDBACK
LOCATION DETAILED: INFERIOR MID FOREHEAD
LOCATION DETAILED: RIGHT SUPERIOR MEDIAL UPPER BACK

## 2024-11-15 ASSESSMENT — LOCATION ZONE DERM
LOCATION ZONE: TRUNK
LOCATION ZONE: FACE
LOCATION ZONE: LEG

## 2024-11-15 NOTE — PROCEDURE: BIOPSY BY SHAVE METHOD
Consent: The provider's intent is to obtain a tissue sample solely for diagnostic purposes.  Written consent to obtain tissue sample was obtained and risks were reviewed including but not limited to scarring, infection, bleeding, scabbing, incomplete removal, nerve damage and allergy to anesthesia.

## 2024-11-15 NOTE — PROCEDURE: FILLERS
Jawline Filler Volume In Cc: 0
Inventory Information: This plan will send filler information to inventory based on the fillers you select. Multiple fillers can be sent but you must ensure you select the appropriate fillers in the inventory tab.
Additional Area 4 Location: katie oral
Additional Area 3 Location: cheeks, jawline
Include Cannula Brand?: DermaSculpt
Map Statment: See Attach Map for Complete Details
Number Of Syringes (Required For Inventory): 1
Include Cannula Information In Note?: No
Additional Area 5 Location: ear lobes
Detail Level: Detailed
Additional Area 1 Location: lateral jaw
Include Cannula Size?: 25G
Additional Area 1 Location: upper and lower lip, earlobes
Additional Area 2 Location: katie oral fine lines
Filler: Juvederm Volbella XC
Additional Area 2 Location: Cheeks, jawline, chin
Show Inventory Tab: Show
Include Cannula Length?: 1.5 inch
Consent: Written consent obtained. Risks include but not limited to bruising, beading, irregular texture, ulceration, infection, allergic reaction, scar formation, incomplete augmentation, temporary nature, procedural pain.
Additional Area 3 Location: Perioral Fine Lines
Include Cannula Information In Note?: Yes
Post-Care Instructions: Patient instructed to apply ice to reduce swelling.
Additional Area 4 Location: Perioral lines, vermillion lips
Additional Area 1 Location: vermillion lips, chin
Additional Area 2 Location: marionette lines, upper and lower lip, cheeks
Additional Area 1 Location: cheek fine lines
Additional Area 3 Location: cheeks, jowels, and tear troughs
Anesthesia Volume In Cc: 0.5

## 2024-11-15 NOTE — PROCEDURE: DYSPORT
Show Ucl Units: No
Additional Area 6 Units: 0
Show [place]eter Units: Yes
Additional Area 5 Location: Chin No charge
Lot #: [id number]
Forehead Units: 20
Detail Level: Detailed
Additional Area 1 Location: Lateral Brows
Additional Area 4 Location: Neck
Dilution (U/0.1 Cc): 1.5
Show Inventory Tab: Show
Consent: Written consent obtained. Risks include but not limited to lid/brow ptosis, bruising, swelling, diplopia, temporary effect, incomplete chemical denervation.
Additional Area 3 Location: neck bands
Expiration Date (Month Year): 2022-10
Post-Care Instructions: Patient instructed to not lie down for 4 hours, limit physical activity for 24 hours and avoid manipulation of the treated areas.

## 2024-11-27 ENCOUNTER — APPOINTMENT (RX ONLY)
Dept: URBAN - METROPOLITAN AREA CLINIC 23 | Facility: CLINIC | Age: 49
Setting detail: DERMATOLOGY
End: 2024-11-27

## 2024-11-27 DIAGNOSIS — Z41.9 ENCOUNTER FOR PROCEDURE FOR PURPOSES OTHER THAN REMEDYING HEALTH STATE, UNSPECIFIED: ICD-10-CM

## 2024-11-27 DIAGNOSIS — B00.1 HERPESVIRAL VESICULAR DERMATITIS: ICD-10-CM

## 2024-11-27 PROBLEM — C44.712 BASAL CELL CARCINOMA OF SKIN OF RIGHT LOWER LIMB, INCLUDING HIP: Status: ACTIVE | Noted: 2024-11-27

## 2024-11-27 PROCEDURE — ? DYSPORT

## 2024-11-27 PROCEDURE — ? PRESCRIPTION

## 2024-11-27 PROCEDURE — ? CURETTAGE AND DESTRUCTION

## 2024-11-27 PROCEDURE — ? COUNSELING

## 2024-11-27 PROCEDURE — 99024 POSTOP FOLLOW-UP VISIT: CPT | Mod: 25

## 2024-11-27 PROCEDURE — 17261 DSTRJ MAL LES T/A/L .6-1.0CM: CPT

## 2024-11-27 RX ORDER — ACYCLOVIR 50 MG/G
OINTMENT TOPICAL
Qty: 15 | Refills: 3 | Status: ERX | COMMUNITY
Start: 2024-11-27

## 2024-11-27 RX ADMIN — ACYCLOVIR: 50 OINTMENT TOPICAL at 00:00

## 2024-11-27 NOTE — PROCEDURE: DYSPORT
Show Lcl Units: No
Periorbital Skin Units: 0
Show Additional Area 6: Yes
Consent: Written consent obtained. Risks include but not limited to lid/brow ptosis, bruising, swelling, diplopia, temporary effect, incomplete chemical denervation.
Additional Area 6 Location: neck bands
Detail Level: Detailed
Additional Area 3 Location: upper lip
Post-Care Instructions: Patient instructed to not lie down for 4 hours, limit physical activity for 24 hours and avoid manipulation of the treated areas.
Show Inventory Tab: Hide
Additional Area 1 Location: forehead touch up
Expiration Date (Month Year): 04-
Lot #: [id number]
Additional Area 4 Location: Neck
Additional Area 1 Units: 20
Dilution (U/0.1 Cc): 1.5
Additional Area 5 Location: Chin No charge
Additional Area 2 Location: chin

## 2025-05-15 ENCOUNTER — APPOINTMENT (OUTPATIENT)
Dept: URBAN - METROPOLITAN AREA CLINIC 98 | Facility: CLINIC | Age: 50
Setting detail: DERMATOLOGY
End: 2025-05-15

## 2025-05-15 DIAGNOSIS — Z41.9 ENCOUNTER FOR PROCEDURE FOR PURPOSES OTHER THAN REMEDYING HEALTH STATE, UNSPECIFIED: ICD-10-CM

## 2025-05-15 PROCEDURE — ? DYSPORT

## 2025-05-15 PROCEDURE — ? FILLERS

## 2025-05-15 PROCEDURE — ? RECOMMENDATIONS

## 2025-05-15 NOTE — PROCEDURE: RECOMMENDATIONS
Recommendations (Free Text): Patient recommended to undergo Coolpeel laser treatment
Detail Level: Zone
Render Risk Assessment In Note?: no

## 2025-05-15 NOTE — PROCEDURE: FILLERS
Additional Area 2 Location: katie oral
Lateral Face Filler Volume In Cc: 0
Additional Area 3 Location: jawline, nlfs, marionette lines, chin
Inventory Information: This plan will send filler information to inventory based on the fillers you select. Multiple fillers can be sent but you must ensure you select the appropriate fillers in the inventory tab.
Detail Level: Detailed
Number Of Syringes (Required For Inventory): 1
Include Cannula Length?: 1.5 inch
Include Cannula Size?: 25G
Additional Area 3 Location: cheeks, jawline
Additional Area 4 Location: jawline, cheeks
Consent: Written consent obtained. Risks include but not limited to bruising, beading, irregular texture, ulceration, infection, allergic reaction, scar formation, incomplete augmentation, temporary nature, procedural pain.
Include Cannula Information In Note?: No
Additional Area 1 Location: upper lip, oral commissures, perioral fine lines
Additional Area 1 Location: lateral jaw
Post-Care Instructions: Patient instructed to apply ice to reduce swelling.
Additional Area 5 Location: ear lobes
Include Cannula Brand?: DermaSculpt
Show Inventory Tab: Show
Additional Area 2 Location: lateral cheeks, jawline
Additional Area 2 Location: katie oral fine lines
Filler: Skinvive
Additional Area 3 Location: neck, jawline, temples
Additional Area 4 Location: perioral fine lines, marionette lines, oral commissures
Include Cannula Information In Note?: Yes
Additional Area 5 Location: Vermillion Lips, Perioral Fine Lines, marionette lines, left earlobe
Additional Area 1 Location: marionette lines, chin, oral commissures
Additional Area 1 Location: chin dimples
Additional Area 2 Location: marionette lines, oral commesure and jawline
Map Statment: See Attach Map for Complete Details
Anesthesia Volume In Cc: 0.5

## 2025-05-15 NOTE — PROCEDURE: DYSPORT
R Brow Units: 0
Show Right And Left Pupillary Line Units: No
Show Forehead Units: Yes
Additional Area 1 Location: lateral brows
Show Inventory Tab: Show
Glabellar Complex Units: 50
Lot #: [id number]
Additional Area 1 Units: 10
Dilution (U/0.1 Cc): 1.5
Additional Area 2 Location: neck
Consent: Written consent obtained. Risks include but not limited to lid/brow ptosis, bruising, swelling, diplopia, temporary effect, incomplete chemical denervation.
Additional Area 5 Location: Chin
Additional Area 3 Location: right brow touch up
Additional Area 2 Units: 20
Post-Care Instructions: Patient instructed to not lie down for 4 hours, limit physical activity for 24 hours and avoid manipulation of the treated areas.
Detail Level: Zone
Expiration Date (Month Year): 06-
Additional Area 6 Location: neck bands
Additional Area 4 Location: upper lip
Forehead Units: 30

## 2025-07-03 ENCOUNTER — RX ONLY (RX ONLY)
Age: 50
End: 2025-07-03

## 2025-07-03 RX ORDER — VALACYCLOVIR HYDROCHLORIDE 500 MG/1
TABLET, FILM COATED ORAL
Qty: 30 | Refills: 0 | Status: ERX